# Patient Record
Sex: FEMALE
[De-identification: names, ages, dates, MRNs, and addresses within clinical notes are randomized per-mention and may not be internally consistent; named-entity substitution may affect disease eponyms.]

---

## 2018-12-04 PROBLEM — Z00.00 ENCOUNTER FOR PREVENTIVE HEALTH EXAMINATION: Status: ACTIVE | Noted: 2018-12-04

## 2019-01-14 ENCOUNTER — APPOINTMENT (OUTPATIENT)
Dept: UROGYNECOLOGY | Facility: CLINIC | Age: 64
End: 2019-01-14
Payer: COMMERCIAL

## 2019-01-14 VITALS
HEIGHT: 64 IN | DIASTOLIC BLOOD PRESSURE: 84 MMHG | WEIGHT: 173 LBS | BODY MASS INDEX: 29.53 KG/M2 | SYSTOLIC BLOOD PRESSURE: 147 MMHG

## 2019-01-14 DIAGNOSIS — E11.9 TYPE 2 DIABETES MELLITUS W/OUT COMPLICATIONS: ICD-10-CM

## 2019-01-14 DIAGNOSIS — R33.9 RETENTION OF URINE, UNSPECIFIED: ICD-10-CM

## 2019-01-14 DIAGNOSIS — E78.00 PURE HYPERCHOLESTEROLEMIA, UNSPECIFIED: ICD-10-CM

## 2019-01-14 DIAGNOSIS — N81.9 FEMALE GENITAL PROLAPSE, UNSPECIFIED: ICD-10-CM

## 2019-01-14 DIAGNOSIS — I10 ESSENTIAL (PRIMARY) HYPERTENSION: ICD-10-CM

## 2019-01-14 DIAGNOSIS — N81.6 RECTOCELE: ICD-10-CM

## 2019-01-14 DIAGNOSIS — R32 UNSPECIFIED URINARY INCONTINENCE: ICD-10-CM

## 2019-01-14 DIAGNOSIS — K59.00 CONSTIPATION, UNSPECIFIED: ICD-10-CM

## 2019-01-14 DIAGNOSIS — E07.9 DISORDER OF THYROID, UNSPECIFIED: ICD-10-CM

## 2019-01-14 LAB
BILIRUB UR QL STRIP: NEGATIVE
CLARITY UR: CLEAR
COLLECTION METHOD: NORMAL
GLUCOSE UR-MCNC: NEGATIVE
HCG UR QL: 0.2 EU/DL
HGB UR QL STRIP.AUTO: NEGATIVE
KETONES UR-MCNC: NEGATIVE
LEUKOCYTE ESTERASE UR QL STRIP: NEGATIVE
NITRITE UR QL STRIP: NEGATIVE
PH UR STRIP: 6
PROT UR STRIP-MCNC: NEGATIVE
SP GR UR STRIP: <=1.005

## 2019-01-14 PROCEDURE — 81003 URINALYSIS AUTO W/O SCOPE: CPT | Mod: QW

## 2019-01-14 PROCEDURE — 99215 OFFICE O/P EST HI 40 MIN: CPT | Mod: 25

## 2019-01-14 PROCEDURE — 51701 INSERT BLADDER CATHETER: CPT

## 2019-01-14 RX ORDER — SIMVASTATIN 80 MG/1
TABLET, FILM COATED ORAL
Refills: 0 | Status: ACTIVE | COMMUNITY

## 2019-01-14 RX ORDER — METFORMIN HYDROCHLORIDE 625 MG/1
TABLET ORAL
Refills: 0 | Status: ACTIVE | COMMUNITY

## 2019-01-14 RX ORDER — LISINOPRIL 30 MG/1
TABLET ORAL
Refills: 0 | Status: ACTIVE | COMMUNITY

## 2019-01-14 NOTE — PHYSICAL EXAM
[No Acute Distress] : in no acute distress [Well developed] : well developed [Well Nourished] : ~L well nourished [Oriented x3] : oriented to person, place, and time [Normal Memory] : ~T memory was ~L unimpaired [Normal Mood/Affect] : mood and affect are normal [Cough] : no cough [No Edema] : ~T edema was not present [Tenderness] : ~T no ~M abdominal tenderness observed [Distended] : not distended [None] : no CVA tenderness [Inguinal LAD] : no adenopathy was noted in the inguinal lymph nodes [Warm and Dry] : was warm and dry to touch [Vulvar Atrophy] : vulvar atrophy [Labia Majora] : were normal [Labia Minora] : were normal [Normal] : was normal [Atrophy] : atrophy [No Bleeding] : there was no active vaginal bleeding [Aa ____] : Aa [unfilled] [Ba ____] : Ba [unfilled] [C ____] : C [unfilled] [GH ____] : GH [unfilled] [PB ____] : PB [unfilled] [TVL ____] : TVL  [unfilled] [Ap ____] : Ap [unfilled] [Bp ____] : Bp [unfilled] [D ____] : D [unfilled] [Absent] : absent [Post Void Residual ____ml] : post void residual via catheterization was [unfilled] ml [Normal rectal exam] : was normal [FreeTextEntry3] : neg CST [FreeTextEntry4] : pain posterior fourchette, mild levator spasm, pain at 6 oclock on qtip testing

## 2019-01-14 NOTE — REASON FOR VISIT
[Initial Visit ___] : [unfilled] is here today for an initial visit  for [unfilled] [Spouse] : spouse [[employer] ] : provided by [employer]   [FreeTextEntry1] : [id number] [FreeTextEntry2] : Stephen [FreeTextEntry3] : Rebecca

## 2019-01-14 NOTE — DISCUSSION/SUMMARY
[FreeTextEntry1] : \gema Roland presents with her , she is s/p reconstruction surgery by Dr. Sanchez 2 years ago and reports she is unable to have intercourse since surgery. She does report vaginal bulge and incontinence after voiding. On exam she does have pain to the posterior perineum with scarring noted, she does have mild levator pain however the vast majority of pain is posteriorly. She asked multiple times if something was done incorrectly during previous surgery and I advised her that dyspareunia is a known risk of any pelvic surgery. We discussed workup of incontinence including cystoscopy and UDS. For the pain, we discussed vaginal atrophy on exam and treatment with vaginal estrogen and physical therapy. We did discuss possibly of revision of posterior repair however I explained multiple times that there is a risk of dyspareunia during revision. I recommend conservative management with vaginal estrogen and PT and reevaluation of symptoms. IUGA handout on low dose vaginal estrogen and bladder training given to the patient. I was able to answer all her questions.

## 2019-01-14 NOTE — PROCEDURE
[FreeTextEntry1] : sterile straight catheterization performed to assess post void residual due to incontinence

## 2019-01-14 NOTE — HISTORY OF PRESENT ILLNESS
[FreeTextEntry1] : \par 2 yrs ago reports prolapse/vaginal cyst and had a vaginal hysterectomy by Dr Sanchez- reports pain after surgery, reports after 2-3 months insurance stopped and reports multiple urinary tract infections. reports leakage after urination, reports rare stress incontinence,  reports unable to have intercourse since surgery, reports prolapse and vaginal bulge, denies vaginal bleeding,  reports lower back pain and kidney pain, reports history of stones, reports frequency,  reports urgency, reports intermittent stream, denies hematuria, denies pelvic pain, denies splinting, reports constipation managed with diet and fiber, \par \par PSH: ?vaginal hysterectomy with uterosacral suspension\par \par daily intake: 40 ounces of water

## 2019-01-14 NOTE — CHIEF COMPLAINT
[Questionnaire Received] : Patient questionnaire received [Sexual Dysfunction] : sexual dysfunction [Poor Bladder Control] : poor bladder control

## 2019-01-28 ENCOUNTER — APPOINTMENT (OUTPATIENT)
Dept: UROGYNECOLOGY | Facility: CLINIC | Age: 64
End: 2019-01-28
Payer: COMMERCIAL

## 2019-01-28 PROCEDURE — 51729 CYSTOMETROGRAM W/VP&UP: CPT

## 2019-01-28 PROCEDURE — 51797 INTRAABDOMINAL PRESSURE TEST: CPT

## 2019-01-28 PROCEDURE — 51784 ANAL/URINARY MUSCLE STUDY: CPT

## 2019-01-28 PROCEDURE — 51741 ELECTRO-UROFLOWMETRY FIRST: CPT

## 2019-02-11 ENCOUNTER — APPOINTMENT (OUTPATIENT)
Dept: UROGYNECOLOGY | Facility: CLINIC | Age: 64
End: 2019-02-11
Payer: COMMERCIAL

## 2019-02-11 DIAGNOSIS — R10.2 PELVIC AND PERINEAL PAIN: ICD-10-CM

## 2019-02-11 DIAGNOSIS — N39.46 MIXED INCONTINENCE: ICD-10-CM

## 2019-02-11 DIAGNOSIS — N81.89 OTHER FEMALE GENITAL PROLAPSE: ICD-10-CM

## 2019-02-11 PROCEDURE — 52000 CYSTOURETHROSCOPY: CPT

## 2019-02-11 PROCEDURE — 99213 OFFICE O/P EST LOW 20 MIN: CPT | Mod: 25

## 2019-02-11 RX ORDER — ESTRADIOL 0.1 MG/G
0.1 CREAM VAGINAL
Qty: 1 | Refills: 2 | Status: ACTIVE | COMMUNITY
Start: 2019-02-11 | End: 1900-01-01

## 2019-02-11 NOTE — REASON FOR VISIT
[Follow-up Visit ___] : a follow-up visit  for [unfilled] [Patient Declined  Services] : - None: Patient declined  services

## 2019-02-11 NOTE — HISTORY OF PRESENT ILLNESS
[FreeTextEntry1] : \par Presents for followup on pain and stress incontinence\par \par cystoscopy today neg\par UDS negative for NITHYA/UUI\par \par reports using estrogen with some improvement in her symptoms, has not tried intercourse yet, has not gone to PT

## 2019-02-11 NOTE — DISCUSSION/SUMMARY
[FreeTextEntry1] : \gema Roland presents for followup, she is s/p vaginal reconstruction with posterior perineal pain. We discussed continuing vaginal estrogen, we again reviewed how much she should be using. We discussed lidocaine to posterior perineum prior to intercourse. We again discussed PT.  She declined  use for todays visit. She will continue with estrace, try topical lidocaine prior to intercourse, and go to PT. I have asked her to return in 3 months. I was able to answer all her questions.

## 2019-05-13 ENCOUNTER — APPOINTMENT (OUTPATIENT)
Dept: UROGYNECOLOGY | Facility: CLINIC | Age: 64
End: 2019-05-13

## 2019-11-14 ENCOUNTER — APPOINTMENT (OUTPATIENT)
Dept: ORTHOPEDIC SURGERY | Facility: CLINIC | Age: 64
End: 2019-11-14
Payer: COMMERCIAL

## 2019-11-14 VITALS
SYSTOLIC BLOOD PRESSURE: 150 MMHG | HEART RATE: 69 BPM | HEIGHT: 64 IN | BODY MASS INDEX: 27.31 KG/M2 | WEIGHT: 160 LBS | DIASTOLIC BLOOD PRESSURE: 93 MMHG

## 2019-11-14 DIAGNOSIS — M72.0 PALMAR FASCIAL FIBROMATOSIS [DUPUYTREN]: ICD-10-CM

## 2019-11-14 DIAGNOSIS — M65.332 TRIGGER FINGER, LEFT MIDDLE FINGER: ICD-10-CM

## 2019-11-14 PROCEDURE — 73130 X-RAY EXAM OF HAND: CPT | Mod: LT

## 2019-11-14 PROCEDURE — 99204 OFFICE O/P NEW MOD 45 MIN: CPT | Mod: 25

## 2019-11-14 PROCEDURE — 20550 NJX 1 TENDON SHEATH/LIGAMENT: CPT | Mod: F2

## 2019-11-14 RX ORDER — MELOXICAM 15 MG/1
15 TABLET ORAL DAILY
Qty: 14 | Refills: 0 | Status: ACTIVE | COMMUNITY
Start: 2019-11-14 | End: 1900-01-01

## 2019-11-14 RX ORDER — METHYLPREDNISOLONE ACETATE 40 MG/ML
40 INJECTION, SUSPENSION INTRA-ARTICULAR; INTRALESIONAL; INTRAMUSCULAR; SOFT TISSUE
Qty: 0.5 | Refills: 0 | Status: ACTIVE | COMMUNITY
Start: 2019-11-14

## 2021-04-13 ENCOUNTER — APPOINTMENT (OUTPATIENT)
Dept: DISASTER EMERGENCY | Facility: OTHER | Age: 66
End: 2021-04-13
Payer: COMMERCIAL

## 2021-04-13 PROCEDURE — 0012A: CPT

## 2023-01-13 ENCOUNTER — APPOINTMENT (OUTPATIENT)
Dept: OBGYN | Facility: CLINIC | Age: 68
End: 2023-01-13
Payer: MEDICARE

## 2023-01-13 VITALS
SYSTOLIC BLOOD PRESSURE: 139 MMHG | WEIGHT: 165 LBS | BODY MASS INDEX: 28.17 KG/M2 | HEIGHT: 64 IN | DIASTOLIC BLOOD PRESSURE: 91 MMHG

## 2023-01-13 DIAGNOSIS — Z12.31 ENCOUNTER FOR SCREENING MAMMOGRAM FOR MALIGNANT NEOPLASM OF BREAST: ICD-10-CM

## 2023-01-13 DIAGNOSIS — Z12.4 ENCOUNTER FOR SCREENING FOR MALIGNANT NEOPLASM OF CERVIX: ICD-10-CM

## 2023-01-13 DIAGNOSIS — N63.10 UNSPECIFIED LUMP IN THE RIGHT BREAST, UNSPECIFIED QUADRANT: ICD-10-CM

## 2023-01-13 DIAGNOSIS — N95.2 POSTMENOPAUSAL ATROPHIC VAGINITIS: ICD-10-CM

## 2023-01-13 DIAGNOSIS — Z01.411 ENCOUNTER FOR GYNECOLOGICAL EXAMINATION (GENERAL) (ROUTINE) WITH ABNORMAL FINDINGS: ICD-10-CM

## 2023-01-13 LAB
BILIRUB UR QL STRIP: NORMAL
GLUCOSE UR-MCNC: 250
HCG UR QL: 0.2 EU/DL
HGB UR QL STRIP.AUTO: NORMAL
KETONES UR-MCNC: NORMAL
LEUKOCYTE ESTERASE UR QL STRIP: NORMAL
NITRITE UR QL STRIP: NORMAL
PH UR STRIP: 5.5
PROT UR STRIP-MCNC: NORMAL
SP GR UR STRIP: 1.02

## 2023-01-13 PROCEDURE — 81003 URINALYSIS AUTO W/O SCOPE: CPT | Mod: QW

## 2023-01-13 PROCEDURE — 99203 OFFICE O/P NEW LOW 30 MIN: CPT | Mod: 25

## 2023-01-13 PROCEDURE — 99387 INIT PM E/M NEW PAT 65+ YRS: CPT

## 2023-01-13 RX ORDER — ESTRADIOL 0.1 MG/G
0.1 CREAM VAGINAL
Qty: 1 | Refills: 2 | Status: ACTIVE | COMMUNITY
Start: 2019-01-14 | End: 1900-01-01

## 2023-01-13 NOTE — HISTORY OF PRESENT ILLNESS
[LMP unknown] : LMP unknown [postmenopausal] : postmenopausal [Y] : Positive pregnancy history [unknown] : Patient is unsure of the date of her LMP [Menarche Age: ____] : age at menarche was [unfilled] [Menopause Age: ____] : age at menopause was [unfilled] [Previously active] : previously active [Men] : men [No] : No [FreeTextEntry1] : Luan is a 67 y.o.   (s/p hysterectomy) here today as a new patient for annual exam. Pt notes it has been years since her last GYN visit. \par \par She previously was seen by Dr. Padilla for pelvic floor weakness and urinary incontinence. Pt notes these issues started after her hysterectomy. She was put on vaginal estrogen cream and advised to f/u with PT. Pt did not complete follow up due to insurance problems and her son was in the hospital, once COVID happened she has not been back. She notes she is still uncomfortable with burning, dryness, she feels like something is in her vagina. \par \par She has not had a mammogram/sonogram in about 5 years, notes a breast mass in her right breast that has been there for many years per patient is stable.\par \par She notes her last pap smear was 2018\par \par She has not been sexually active with her  due to her vaginal issues \par \par Pt notes an ongoing ear problem, she is dizzy frequently and is trying to find a doctor that is covered by her insurance, she is planning on calling today to make an appointment with an ENT  [Mammogramdate] : 2018 [TextBox_19] : WNL PER PT [BreastSonogramDate] : 2018 [TextBox_25] : WNL PER PT [PapSmeardate] : 2016 [TextBox_31] : WNL PER PT [ColonoscopyDate] : UNSURE DATE [PGHxTotal] : 3 [facility]] : 3 [[facility] Center[facility]] : 3

## 2023-01-13 NOTE — PHYSICAL EXAM
[Chaperone Present] : A chaperone was present in the examining room during all aspects of the physical examination [Appropriately responsive] : appropriately responsive [Alert] : alert [No Acute Distress] : no acute distress [Oriented x3] : oriented x3 [Examination Of The Breasts] : a normal appearance [No Masses] : no breast masses were palpable [Labia Majora] : normal [Labia Minora] : normal [Normal] : normal [Atrophy] : atrophy [Cystocele] : a cystocele [Absent] : absent [Uterine Adnexae] : normal [FreeTextEntry1] : Elvie AGEE MA  [FreeTextEntry3] : atrophy at meatus

## 2023-01-13 NOTE — REVIEW OF SYSTEMS
[Patient Intake Form Reviewed] : Patient intake form was reviewed [FreeTextEntry8] : burning/discomfort

## 2023-01-17 LAB — HPV HIGH+LOW RISK DNA PNL CVX: NOT DETECTED

## 2023-01-19 LAB — CYTOLOGY CVX/VAG DOC THIN PREP: ABNORMAL

## 2023-01-24 ENCOUNTER — OFFICE (OUTPATIENT)
Dept: URBAN - METROPOLITAN AREA CLINIC 12 | Facility: CLINIC | Age: 68
Setting detail: OPHTHALMOLOGY
End: 2023-01-24
Payer: MEDICARE

## 2023-01-24 DIAGNOSIS — H52.03: ICD-10-CM

## 2023-01-24 DIAGNOSIS — H43.812: ICD-10-CM

## 2023-01-24 DIAGNOSIS — E11.3293: ICD-10-CM

## 2023-01-24 DIAGNOSIS — H35.033: ICD-10-CM

## 2023-01-24 DIAGNOSIS — H35.361: ICD-10-CM

## 2023-01-24 PROBLEM — H25.13 CATARACT SENILE NUCLEAR SCLEROSIS; BOTH EYES: Status: ACTIVE | Noted: 2023-01-24

## 2023-01-24 PROBLEM — H52.7 REFRACTIVE ERROR: Status: ACTIVE | Noted: 2023-01-24

## 2023-01-24 PROCEDURE — 92015 DETERMINE REFRACTIVE STATE: CPT | Performed by: STUDENT IN AN ORGANIZED HEALTH CARE EDUCATION/TRAINING PROGRAM

## 2023-01-24 PROCEDURE — 92004 COMPRE OPH EXAM NEW PT 1/>: CPT | Performed by: STUDENT IN AN ORGANIZED HEALTH CARE EDUCATION/TRAINING PROGRAM

## 2023-01-24 PROCEDURE — 92250 FUNDUS PHOTOGRAPHY W/I&R: CPT | Performed by: STUDENT IN AN ORGANIZED HEALTH CARE EDUCATION/TRAINING PROGRAM

## 2023-01-24 ASSESSMENT — REFRACTION_AUTOREFRACTION
OD_AXIS: 083
OS_SPHERE: +1.75
OD_SPHERE: +1.75
OD_CYLINDER: -0.25
OS_AXIS: 108
OS_CYLINDER: -0.75

## 2023-01-24 ASSESSMENT — REFRACTION_MANIFEST
OS_CYLINDER: -0.50
OD_VA1: 20/20
OS_ADD: +2.50
OD_CYLINDER: -0.50
OD_AXIS: 085
OD_ADD: +2.50
OS_VA1: 20/20
OD_SPHERE: +1.50
OS_SPHERE: +1.50
OS_AXIS: 105

## 2023-01-24 ASSESSMENT — VISUAL ACUITY
OD_BCVA: 20/50-2
OS_BCVA: 20/50-1

## 2023-01-24 ASSESSMENT — SPHEQUIV_DERIVED
OS_SPHEQUIV: 1.375
OD_SPHEQUIV: 1.25
OD_SPHEQUIV: 1.625
OS_SPHEQUIV: 1.25

## 2023-01-24 ASSESSMENT — CONFRONTATIONAL VISUAL FIELD TEST (CVF)
OD_FINDINGS: FULL
OS_FINDINGS: FULL

## 2023-01-24 ASSESSMENT — KERATOMETRY
OS_AXISANGLE_DEGREES: 009
OS_K2POWER_DIOPTERS: 44.50
OS_K1POWER_DIOPTERS: 44.00
OD_K1POWER_DIOPTERS: 44.00
OD_K2POWER_DIOPTERS: 44.50
OD_AXISANGLE_DEGREES: 075

## 2023-01-24 ASSESSMENT — AXIALLENGTH_DERIVED
OS_AL: 22.8533
OD_AL: 22.8533
OD_AL: 22.717
OS_AL: 22.8077

## 2023-01-24 ASSESSMENT — TONOMETRY
OD_IOP_MMHG: 18
OS_IOP_MMHG: 17

## 2023-02-20 ENCOUNTER — OFFICE (OUTPATIENT)
Dept: URBAN - METROPOLITAN AREA CLINIC 12 | Facility: CLINIC | Age: 68
Setting detail: OPHTHALMOLOGY
End: 2023-02-20
Payer: MEDICARE

## 2023-02-20 ENCOUNTER — RX ONLY (RX ONLY)
Age: 68
End: 2023-02-20

## 2023-02-20 DIAGNOSIS — H43.812: ICD-10-CM

## 2023-02-20 DIAGNOSIS — H35.361: ICD-10-CM

## 2023-02-20 DIAGNOSIS — H35.033: ICD-10-CM

## 2023-02-20 DIAGNOSIS — E11.3293: ICD-10-CM

## 2023-02-20 PROBLEM — H16.223 DRY EYE SYNDROME K SICCA; BOTH EYES: Status: ACTIVE | Noted: 2023-02-20

## 2023-02-20 PROCEDURE — 92134 CPTRZ OPH DX IMG PST SGM RTA: CPT | Performed by: STUDENT IN AN ORGANIZED HEALTH CARE EDUCATION/TRAINING PROGRAM

## 2023-02-20 PROCEDURE — 99213 OFFICE O/P EST LOW 20 MIN: CPT | Performed by: STUDENT IN AN ORGANIZED HEALTH CARE EDUCATION/TRAINING PROGRAM

## 2023-02-20 ASSESSMENT — REFRACTION_AUTOREFRACTION
OD_AXIS: 092
OS_CYLINDER: -0.50
OS_SPHERE: +1.75
OD_CYLINDER: -0.25
OS_AXIS: 113
OD_SPHERE: +1.75

## 2023-02-20 ASSESSMENT — REFRACTION_MANIFEST
OD_SPHERE: +1.75
OS_VA1: 20/20
OS_VA1: 20/20
OD_CYLINDER: SPH
OS_CYLINDER: -0.50
OD_VA1: 20/20
OD_ADD: +2.50
OS_AXIS: 115
OD_CYLINDER: -0.25
OD_VA1: 20/20
OS_AXIS: 110
OS_SPHERE: +1.75
OS_ADD: +2.50
OS_SPHERE: +1.50
OD_AXIS: 090
OD_SPHERE: +1.50
OS_CYLINDER: -0.50

## 2023-02-20 ASSESSMENT — REFRACTION_CURRENTRX
OS_AXIS: 102
OD_SPHERE: +3.75
OD_CYLINDER: -0.50
OD_OVR_VA: 20/
OS_OVR_VA: 20/
OS_CYLINDER: -0.50
OD_AXIS: 082
OS_VPRISM_DIRECTION: SV
OD_VPRISM_DIRECTION: SV
OS_SPHERE: +4.00

## 2023-02-20 ASSESSMENT — KERATOMETRY
OD_K2POWER_DIOPTERS: 44.75
OS_K2POWER_DIOPTERS: 44.25
OS_AXISANGLE_DEGREES: 151
OS_K1POWER_DIOPTERS: 44.00
OD_AXISANGLE_DEGREES: 040
OD_K1POWER_DIOPTERS: 44.00

## 2023-02-20 ASSESSMENT — AXIALLENGTH_DERIVED
OS_AL: 22.8051
OD_AL: 22.6745
OS_AL: 22.8965
OS_AL: 22.8051
OD_AL: 22.6745

## 2023-02-20 ASSESSMENT — VISUAL ACUITY
OS_BCVA: 20/50+1
OD_BCVA: 20/50-2

## 2023-02-20 ASSESSMENT — SPHEQUIV_DERIVED
OD_SPHEQUIV: 1.625
OS_SPHEQUIV: 1.25
OS_SPHEQUIV: 1.5
OS_SPHEQUIV: 1.5
OD_SPHEQUIV: 1.625

## 2023-02-20 ASSESSMENT — TONOMETRY
OD_IOP_MMHG: 21
OS_IOP_MMHG: 18

## 2023-02-20 ASSESSMENT — CONFRONTATIONAL VISUAL FIELD TEST (CVF)
OS_FINDINGS: FULL
OD_FINDINGS: FULL

## 2023-05-12 ENCOUNTER — OFFICE (OUTPATIENT)
Dept: URBAN - METROPOLITAN AREA CLINIC 88 | Facility: CLINIC | Age: 68
Setting detail: OPHTHALMOLOGY
End: 2023-05-12
Payer: MEDICARE

## 2023-05-12 DIAGNOSIS — E11.9: ICD-10-CM

## 2023-05-12 DIAGNOSIS — H35.033: ICD-10-CM

## 2023-05-12 DIAGNOSIS — H43.393: ICD-10-CM

## 2023-05-12 DIAGNOSIS — H35.361: ICD-10-CM

## 2023-05-12 DIAGNOSIS — H43.812: ICD-10-CM

## 2023-05-12 PROCEDURE — 92134 CPTRZ OPH DX IMG PST SGM RTA: CPT | Performed by: SPECIALIST

## 2023-05-12 PROCEDURE — 92235 FLUORESCEIN ANGRPH MLTIFRAME: CPT | Performed by: SPECIALIST

## 2023-05-12 PROCEDURE — 99213 OFFICE O/P EST LOW 20 MIN: CPT | Performed by: SPECIALIST

## 2023-05-12 ASSESSMENT — KERATOMETRY
OS_AXISANGLE_DEGREES: 151
OS_K2POWER_DIOPTERS: 44.25
OD_K2POWER_DIOPTERS: 44.75
OS_K1POWER_DIOPTERS: 44.00
OD_K1POWER_DIOPTERS: 44.00
OD_AXISANGLE_DEGREES: 040

## 2023-05-12 ASSESSMENT — TONOMETRY
OD_IOP_MMHG: 19
OS_IOP_MMHG: 17

## 2023-05-12 ASSESSMENT — REFRACTION_AUTOREFRACTION
OS_SPHERE: +1.75
OD_CYLINDER: -0.25
OS_AXIS: 113
OS_CYLINDER: -0.50
OD_AXIS: 092
OD_SPHERE: +1.75

## 2023-05-12 ASSESSMENT — VISUAL ACUITY
OD_BCVA: 20/50
OS_BCVA: 20/80-1

## 2023-05-12 ASSESSMENT — AXIALLENGTH_DERIVED
OD_AL: 22.6745
OS_AL: 22.8051

## 2023-05-12 ASSESSMENT — SPHEQUIV_DERIVED
OS_SPHEQUIV: 1.5
OD_SPHEQUIV: 1.625

## 2023-05-12 ASSESSMENT — CONFRONTATIONAL VISUAL FIELD TEST (CVF)
OS_FINDINGS: FULL
OD_FINDINGS: FULL

## 2024-01-29 ENCOUNTER — OFFICE (OUTPATIENT)
Dept: URBAN - METROPOLITAN AREA CLINIC 12 | Facility: CLINIC | Age: 69
Setting detail: OPHTHALMOLOGY
End: 2024-01-29
Payer: COMMERCIAL

## 2024-01-29 DIAGNOSIS — H43.813: ICD-10-CM

## 2024-01-29 DIAGNOSIS — H35.033: ICD-10-CM

## 2024-01-29 DIAGNOSIS — E11.9: ICD-10-CM

## 2024-01-29 DIAGNOSIS — H35.361: ICD-10-CM

## 2024-01-29 PROCEDURE — 92014 COMPRE OPH EXAM EST PT 1/>: CPT | Performed by: STUDENT IN AN ORGANIZED HEALTH CARE EDUCATION/TRAINING PROGRAM

## 2024-01-29 PROCEDURE — 92250 FUNDUS PHOTOGRAPHY W/I&R: CPT | Performed by: STUDENT IN AN ORGANIZED HEALTH CARE EDUCATION/TRAINING PROGRAM

## 2024-01-29 ASSESSMENT — REFRACTION_MANIFEST
OS_SPHERE: +1.50
OD_ADD: +2.50
OS_ADD: +2.50
OD_AXIS: 085
OD_VA1: 20/20
OD_SPHERE: +1.75
OS_CYLINDER: -0.50
OU_VA: 20/20
OS_VA1: 20/20
OS_AXIS: 105
OD_CYLINDER: -0.75

## 2024-01-29 ASSESSMENT — REFRACTION_AUTOREFRACTION
OD_AXIS: 83
OS_CYLINDER: -0.50
OD_CYLINDER: -0.75
OS_AXIS: 106
OD_SPHERE: +2.25
OS_SPHERE: +1.75

## 2024-01-29 ASSESSMENT — CONFRONTATIONAL VISUAL FIELD TEST (CVF)
OD_FINDINGS: FULL
OS_FINDINGS: FULL

## 2024-01-29 ASSESSMENT — SPHEQUIV_DERIVED
OD_SPHEQUIV: 1.875
OS_SPHEQUIV: 1.25
OD_SPHEQUIV: 1.375
OS_SPHEQUIV: 1.5

## 2024-02-29 ENCOUNTER — OFFICE (OUTPATIENT)
Dept: URBAN - METROPOLITAN AREA CLINIC 12 | Facility: CLINIC | Age: 69
Setting detail: OPHTHALMOLOGY
End: 2024-02-29
Payer: COMMERCIAL

## 2024-02-29 DIAGNOSIS — E11.9: ICD-10-CM

## 2024-02-29 DIAGNOSIS — H43.813: ICD-10-CM

## 2024-02-29 DIAGNOSIS — H35.033: ICD-10-CM

## 2024-02-29 DIAGNOSIS — H35.361: ICD-10-CM

## 2024-02-29 PROCEDURE — 99213 OFFICE O/P EST LOW 20 MIN: CPT | Performed by: STUDENT IN AN ORGANIZED HEALTH CARE EDUCATION/TRAINING PROGRAM

## 2024-02-29 ASSESSMENT — REFRACTION_MANIFEST
OD_CYLINDER: -0.75
OD_ADD: +2.50
OS_CYLINDER: -0.50
OS_AXIS: 105
OD_VA1: 20/20
OS_ADD: +2.50
OS_VA1: 20/20
OD_AXIS: 085
OS_SPHERE: +1.50
OU_VA: 20/20
OD_SPHERE: +1.75

## 2024-02-29 ASSESSMENT — SPHEQUIV_DERIVED
OS_SPHEQUIV: 1.25
OD_SPHEQUIV: 1.375

## 2024-02-29 ASSESSMENT — CONFRONTATIONAL VISUAL FIELD TEST (CVF)
OS_FINDINGS: FULL
OD_FINDINGS: FULL

## 2024-06-17 ENCOUNTER — OFFICE (OUTPATIENT)
Dept: URBAN - METROPOLITAN AREA CLINIC 12 | Facility: CLINIC | Age: 69
Setting detail: OPHTHALMOLOGY
End: 2024-06-17
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
## 2024-06-17 PROCEDURE — NO SHOW FE NO SHOW FEE

## 2024-12-05 ENCOUNTER — OFFICE (OUTPATIENT)
Dept: URBAN - METROPOLITAN AREA CLINIC 12 | Facility: CLINIC | Age: 69
Setting detail: OPHTHALMOLOGY
End: 2024-12-05
Payer: COMMERCIAL

## 2024-12-05 DIAGNOSIS — H10.45: ICD-10-CM

## 2024-12-05 PROCEDURE — 92012 INTRM OPH EXAM EST PATIENT: CPT | Performed by: STUDENT IN AN ORGANIZED HEALTH CARE EDUCATION/TRAINING PROGRAM

## 2024-12-05 ASSESSMENT — REFRACTION_MANIFEST
OD_AXIS: 085
OS_VA1: 20/20
OD_CYLINDER: -0.75
OS_SPHERE: +1.50
OD_ADD: +2.50
OS_AXIS: 105
OS_ADD: +2.50
OD_SPHERE: +1.75
OU_VA: 20/20
OD_VA1: 20/20
OS_CYLINDER: -0.50

## 2024-12-05 ASSESSMENT — KERATOMETRY
OD_K2POWER_DIOPTERS: 44.50
OS_AXISANGLE_DEGREES: 078
OS_K2POWER_DIOPTERS: 44.50
METHOD_AUTO_MANUAL: AUTO
OD_AXISANGLE_DEGREES: 007
OS_K1POWER_DIOPTERS: 44.25
OD_K1POWER_DIOPTERS: 44.00

## 2024-12-05 ASSESSMENT — VISUAL ACUITY
OS_BCVA: 20/30-2
OD_BCVA: 20/30-2

## 2024-12-05 ASSESSMENT — TONOMETRY: OS_IOP_MMHG: 17

## 2024-12-05 ASSESSMENT — CONFRONTATIONAL VISUAL FIELD TEST (CVF)
OS_FINDINGS: FULL
OD_FINDINGS: FULL

## 2024-12-05 ASSESSMENT — REFRACTION_AUTOREFRACTION
OD_SPHERE: +1.75
OS_AXIS: 110
OD_AXIS: 081
OS_CYLINDER: -0.50
OS_SPHERE: +1.50
OD_CYLINDER: -0.25

## 2025-02-06 ENCOUNTER — OFFICE (OUTPATIENT)
Dept: URBAN - METROPOLITAN AREA CLINIC 12 | Facility: CLINIC | Age: 70
Setting detail: OPHTHALMOLOGY
End: 2025-02-06
Payer: COMMERCIAL

## 2025-02-06 DIAGNOSIS — H10.45: ICD-10-CM

## 2025-02-06 DIAGNOSIS — E11.9: ICD-10-CM

## 2025-02-06 DIAGNOSIS — H43.393: ICD-10-CM

## 2025-02-06 DIAGNOSIS — H35.361: ICD-10-CM

## 2025-02-06 DIAGNOSIS — H43.813: ICD-10-CM

## 2025-02-06 DIAGNOSIS — H35.033: ICD-10-CM

## 2025-02-06 DIAGNOSIS — H16.223: ICD-10-CM

## 2025-02-06 DIAGNOSIS — H25.13: ICD-10-CM

## 2025-02-06 PROCEDURE — 92014 COMPRE OPH EXAM EST PT 1/>: CPT | Performed by: STUDENT IN AN ORGANIZED HEALTH CARE EDUCATION/TRAINING PROGRAM

## 2025-02-06 PROCEDURE — 92134 CPTRZ OPH DX IMG PST SGM RTA: CPT | Performed by: STUDENT IN AN ORGANIZED HEALTH CARE EDUCATION/TRAINING PROGRAM

## 2025-02-06 ASSESSMENT — REFRACTION_MANIFEST
OS_AXIS: 105
OD_VA1: 20/20
OD_ADD: +2.50
OS_VA1: 20/20
OS_ADD: +2.50
OD_SPHERE: +1.75
OS_SPHERE: +1.50
OD_CYLINDER: -0.75
OS_CYLINDER: -0.50
OU_VA: 20/20
OD_AXIS: 085

## 2025-02-06 ASSESSMENT — VISUAL ACUITY
OD_BCVA: 20/25-
OS_BCVA: 20/25-2

## 2025-02-06 ASSESSMENT — REFRACTION_AUTOREFRACTION
OD_AXIS: 079
OD_CYLINDER: -0.50
OS_AXIS: 114
OD_SPHERE: +2.00
OS_SPHERE: +1.75
OS_CYLINDER: -0.50

## 2025-02-06 ASSESSMENT — KERATOMETRY
OS_K2POWER_DIOPTERS: 44.50
OD_K2POWER_DIOPTERS: 44.75
OD_K1POWER_DIOPTERS: 44.25
OS_AXISANGLE_DEGREES: 050
OS_K1POWER_DIOPTERS: 44.25
OD_AXISANGLE_DEGREES: 065
METHOD_AUTO_MANUAL: AUTO

## 2025-02-06 ASSESSMENT — CONFRONTATIONAL VISUAL FIELD TEST (CVF)
OD_FINDINGS: FULL
OS_FINDINGS: FULL

## 2025-02-06 ASSESSMENT — TONOMETRY
OS_IOP_MMHG: 18
OD_IOP_MMHG: 20

## 2025-02-25 NOTE — DISCUSSION/SUMMARY
[FreeTextEntry1] : Pap completed today, will f/u results\par \par Encouraged self breast exams. RX for screening mammogram and sono provided due to hx of abnormal mammograms with right breast stable mass. [facility] imaging locations provided will f/u results. \par \par We discussed vulvar atrophy, RX renewed for vaginal estrace. Reviewed apply cream nightly for 2 weeks then twice weekly. We reviewed vulvar hygiene practices. Possible bladder prolapse, advised pt f/u again with Dr. Padilla for evaluation and continued management now that she has her insurance. Pt agrees with plan. \par \par FU annually or sooner as needed, all questions addressed  [facility] provides services at a reduced cost to those who are determined to be eligible through [facility]’s financial assistance program. Information regarding [facility]’s financial assistance program can be found by going to [email or web address] or by calling [phone].

## 2025-04-23 ENCOUNTER — OFFICE (OUTPATIENT)
Dept: URBAN - METROPOLITAN AREA CLINIC 12 | Facility: CLINIC | Age: 70
Setting detail: OPHTHALMOLOGY
End: 2025-04-23
Payer: MEDICARE

## 2025-04-23 ENCOUNTER — RX ONLY (RX ONLY)
Age: 70
End: 2025-04-23

## 2025-04-23 DIAGNOSIS — H16.223: ICD-10-CM

## 2025-04-23 DIAGNOSIS — H04.122: ICD-10-CM
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
## 2025-04-23 DIAGNOSIS — H04.123: ICD-10-CM

## 2025-04-23 DIAGNOSIS — H04.121: ICD-10-CM

## 2025-04-23 PROCEDURE — 83861 MICROFLUID ANALY TEARS: CPT | Mod: RT | Performed by: STUDENT IN AN ORGANIZED HEALTH CARE EDUCATION/TRAINING PROGRAM

## 2025-04-23 PROCEDURE — 92012 INTRM OPH EXAM EST PATIENT: CPT | Performed by: STUDENT IN AN ORGANIZED HEALTH CARE EDUCATION/TRAINING PROGRAM

## 2025-04-23 PROCEDURE — BRUDER EYE BRUDER EYE PADS: Performed by: STUDENT IN AN ORGANIZED HEALTH CARE EDUCATION/TRAINING PROGRAM

## 2025-04-23 PROCEDURE — 83861 MICROFLUID ANALY TEARS: CPT | Mod: LT | Performed by: STUDENT IN AN ORGANIZED HEALTH CARE EDUCATION/TRAINING PROGRAM

## 2025-04-23 ASSESSMENT — VISUAL ACUITY
OD_BCVA: 20/25-
OS_BCVA: 20/25+2

## 2025-04-23 ASSESSMENT — REFRACTION_AUTOREFRACTION
OD_AXIS: 094
OD_CYLINDER: -1.25
OS_AXIS: 100
OD_SPHERE: +2.00
OS_SPHERE: +1.00
OS_CYLINDER: -0.75

## 2025-04-23 ASSESSMENT — KERATOMETRY
OD_K2POWER_DIOPTERS: 44.50
OS_AXISANGLE_DEGREES: 090
OD_K1POWER_DIOPTERS: 43.75
OS_K1POWER_DIOPTERS: 44.50
OD_AXISANGLE_DEGREES: 015
OS_K2POWER_DIOPTERS: 44.50
METHOD_AUTO_MANUAL: AUTO

## 2025-04-23 ASSESSMENT — REFRACTION_MANIFEST
OS_VA1: 20/20
OS_CYLINDER: -0.50
OU_VA: 20/20
OS_AXIS: 105
OS_SPHERE: +1.50
OD_AXIS: 085
OD_SPHERE: +1.75
OS_ADD: +2.50
OD_ADD: +2.50
OD_CYLINDER: -0.75
OD_VA1: 20/20

## 2025-04-23 ASSESSMENT — CONFRONTATIONAL VISUAL FIELD TEST (CVF)
OS_FINDINGS: FULL
OD_FINDINGS: FULL

## 2025-04-23 ASSESSMENT — TONOMETRY
OD_IOP_MMHG: 16
OS_IOP_MMHG: 17